# Patient Record
Sex: MALE | Race: WHITE | Employment: FULL TIME | URBAN - METROPOLITAN AREA
[De-identification: names, ages, dates, MRNs, and addresses within clinical notes are randomized per-mention and may not be internally consistent; named-entity substitution may affect disease eponyms.]

---

## 2020-11-27 ENCOUNTER — HOSPITAL ENCOUNTER (OUTPATIENT)
Age: 30
Discharge: HOME | End: 2020-11-27
Attending: EMERGENCY MEDICINE
Payer: OTHER GOVERNMENT

## 2020-11-27 VITALS
HEART RATE: 85 BPM | OXYGEN SATURATION: 98 % | DIASTOLIC BLOOD PRESSURE: 56 MMHG | HEIGHT: 72 IN | WEIGHT: 280 LBS | RESPIRATION RATE: 16 BRPM | SYSTOLIC BLOOD PRESSURE: 137 MMHG | BODY MASS INDEX: 37.93 KG/M2 | TEMPERATURE: 98 F

## 2020-11-27 DIAGNOSIS — Z20.822 ENCOUNTER FOR LABORATORY TESTING FOR COVID-19 VIRUS: Primary | ICD-10-CM

## 2020-11-27 DIAGNOSIS — J18.9 COMMUNITY ACQUIRED PNEUMONIA, UNSPECIFIED LATERALITY: ICD-10-CM

## 2020-11-27 PROCEDURE — 99213 OFFICE O/P EST LOW 20 MIN: CPT | Performed by: EMERGENCY MEDICINE

## 2020-11-27 RX ORDER — AZITHROMYCIN 250 MG/1
TABLET, FILM COATED ORAL
Qty: 1 PACKAGE | Refills: 0 | Status: SHIPPED | OUTPATIENT
Start: 2020-11-27 | End: 2020-12-02

## 2020-11-27 RX ORDER — ALBUTEROL SULFATE 90 UG/1
2 AEROSOL, METERED RESPIRATORY (INHALATION) EVERY 4 HOURS PRN
Qty: 1 INHALER | Refills: 0 | Status: SHIPPED | OUTPATIENT
Start: 2020-11-27 | End: 2020-12-27

## 2020-11-27 NOTE — ED INITIAL ASSESSMENT (HPI)
Pt is here for covid testing. Wife is positive with covid. Pt has body aches, loss of taste and smell, cough since Saturday.

## 2022-03-15 ENCOUNTER — HOSPITAL ENCOUNTER (OUTPATIENT)
Age: 32
Discharge: HOME | End: 2022-03-15

## 2022-03-15 VITALS
SYSTOLIC BLOOD PRESSURE: 110 MMHG | RESPIRATION RATE: 18 BRPM | OXYGEN SATURATION: 97 % | HEART RATE: 85 BPM | HEIGHT: 72 IN | TEMPERATURE: 99 F | BODY MASS INDEX: 35.89 KG/M2 | WEIGHT: 265 LBS | DIASTOLIC BLOOD PRESSURE: 67 MMHG

## 2022-03-15 DIAGNOSIS — J02.0 STREPTOCOCCAL SORE THROAT: Primary | ICD-10-CM

## 2022-03-15 LAB — S PYO AG THROAT QL: POSITIVE

## 2022-03-15 PROCEDURE — 87880 STREP A ASSAY W/OPTIC: CPT | Performed by: NURSE PRACTITIONER

## 2022-03-15 PROCEDURE — 99203 OFFICE O/P NEW LOW 30 MIN: CPT | Performed by: NURSE PRACTITIONER

## 2022-03-15 RX ORDER — PENICILLIN V POTASSIUM 500 MG/1
500 TABLET ORAL 2 TIMES DAILY
Qty: 40 TABLET | Refills: 0 | Status: SHIPPED | OUTPATIENT
Start: 2022-03-15 | End: 2022-03-25

## 2022-11-02 ENCOUNTER — APPOINTMENT (OUTPATIENT)
Dept: GENERAL RADIOLOGY | Age: 32
End: 2022-11-02
Attending: NURSE PRACTITIONER

## 2022-11-02 ENCOUNTER — APPOINTMENT (OUTPATIENT)
Dept: CT IMAGING | Age: 32
End: 2022-11-02
Attending: NURSE PRACTITIONER

## 2022-11-02 ENCOUNTER — HOSPITAL ENCOUNTER (OUTPATIENT)
Age: 32
Discharge: HOME | End: 2022-11-02

## 2022-11-02 VITALS
HEART RATE: 85 BPM | SYSTOLIC BLOOD PRESSURE: 124 MMHG | RESPIRATION RATE: 20 BRPM | OXYGEN SATURATION: 98 % | TEMPERATURE: 98 F | DIASTOLIC BLOOD PRESSURE: 70 MMHG
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
## 2022-11-02 DIAGNOSIS — M48.48XA STRESS FRACTURE OF SACRUM, INITIAL ENCOUNTER: ICD-10-CM

## 2022-11-02 DIAGNOSIS — S39.012A BACK STRAIN, INITIAL ENCOUNTER: ICD-10-CM

## 2022-11-02 PROCEDURE — 72128 CT CHEST SPINE W/O DYE: CPT | Performed by: NURSE PRACTITIONER

## 2022-11-02 PROCEDURE — 72072 X-RAY EXAM THORAC SPINE 3VWS: CPT | Performed by: NURSE PRACTITIONER

## 2022-11-02 PROCEDURE — 72131 CT LUMBAR SPINE W/O DYE: CPT | Performed by: NURSE PRACTITIONER

## 2022-11-02 PROCEDURE — 72100 X-RAY EXAM L-S SPINE 2/3 VWS: CPT | Performed by: NURSE PRACTITIONER

## 2022-11-02 PROCEDURE — 99214 OFFICE O/P EST MOD 30 MIN: CPT | Performed by: NURSE PRACTITIONER

## 2022-11-02 RX ORDER — CYCLOBENZAPRINE HCL 10 MG
10 TABLET ORAL 3 TIMES DAILY PRN
Qty: 10 TABLET | Refills: 0 | Status: SHIPPED | OUTPATIENT
Start: 2022-11-02 | End: 2022-11-09

## 2022-11-02 RX ORDER — IBUPROFEN 600 MG/1
600 TABLET ORAL ONCE
Status: COMPLETED | OUTPATIENT
Start: 2022-11-02 | End: 2022-11-02

## 2022-11-02 RX ORDER — IBUPROFEN 600 MG/1
600 TABLET ORAL EVERY 6 HOURS PRN
Qty: 12 TABLET | Refills: 0 | Status: SHIPPED | OUTPATIENT
Start: 2022-11-02 | End: 2022-11-09

## 2022-11-02 NOTE — DISCHARGE INSTRUCTIONS
Ibuprofen as needed for pain. Flexeril to relax tight muscles. Do not take this and drive or drink alcohol. Ice. Gentle stretching. Push fluids. Expect to be sore for the next few days, you will be more sore tomorrow. You should follow-up with orthopedics to discuss the CT findings.

## 2023-12-22 ENCOUNTER — HOSPITAL ENCOUNTER (OUTPATIENT)
Age: 33
Discharge: HOME | End: 2023-12-22
Payer: MEDICAID

## 2023-12-22 ENCOUNTER — APPOINTMENT (OUTPATIENT)
Dept: GENERAL RADIOLOGY | Age: 33
End: 2023-12-22
Attending: NURSE PRACTITIONER
Payer: MEDICAID

## 2023-12-22 VITALS
TEMPERATURE: 98 F | BODY MASS INDEX: 37.11 KG/M2 | WEIGHT: 280 LBS | HEIGHT: 73 IN | RESPIRATION RATE: 24 BRPM | OXYGEN SATURATION: 98 % | DIASTOLIC BLOOD PRESSURE: 82 MMHG | HEART RATE: 88 BPM | SYSTOLIC BLOOD PRESSURE: 137 MMHG

## 2023-12-22 DIAGNOSIS — J06.9 UPPER RESPIRATORY VIRUS: ICD-10-CM

## 2023-12-22 DIAGNOSIS — H66.92 LEFT OTITIS MEDIA, UNSPECIFIED OTITIS MEDIA TYPE: Primary | ICD-10-CM

## 2023-12-22 LAB
POCT INFLUENZA A: NEGATIVE
POCT INFLUENZA B: NEGATIVE
SARS-COV-2 RNA RESP QL NAA+PROBE: NOT DETECTED

## 2023-12-22 PROCEDURE — U0002 COVID-19 LAB TEST NON-CDC: HCPCS | Performed by: NURSE PRACTITIONER

## 2023-12-22 PROCEDURE — 87502 INFLUENZA DNA AMP PROBE: CPT | Performed by: NURSE PRACTITIONER

## 2023-12-22 PROCEDURE — 99213 OFFICE O/P EST LOW 20 MIN: CPT | Performed by: NURSE PRACTITIONER

## 2023-12-22 PROCEDURE — 71046 X-RAY EXAM CHEST 2 VIEWS: CPT | Performed by: NURSE PRACTITIONER

## 2023-12-22 RX ORDER — BENZONATATE 100 MG/1
200 CAPSULE ORAL 3 TIMES DAILY PRN
Qty: 30 CAPSULE | Refills: 0 | Status: SHIPPED | OUTPATIENT
Start: 2023-12-22 | End: 2024-01-21

## 2023-12-22 RX ORDER — AMOXICILLIN AND CLAVULANATE POTASSIUM 875; 125 MG/1; MG/1
1 TABLET, FILM COATED ORAL 2 TIMES DAILY
Qty: 20 TABLET | Refills: 0 | Status: SHIPPED | OUTPATIENT
Start: 2023-12-22 | End: 2024-01-01

## 2023-12-22 NOTE — DISCHARGE INSTRUCTIONS
Push fluids. Rest.  Tylenol or ibuprofen as needed for pain or fever. Take the medications as prescribed. Follow-up with your doctor the doctor referred to. Return for any concerns.

## 2023-12-22 NOTE — ED INITIAL ASSESSMENT (HPI)
Pt with cough, sore throat, body aches, chills x 3 days.  States cough has been present for ~ 1-2 weeks but has worsened over the past three days

## 2024-06-29 ENCOUNTER — HOSPITAL ENCOUNTER (OUTPATIENT)
Age: 34
Discharge: HOME | End: 2024-06-29
Payer: MEDICAID

## 2024-06-29 ENCOUNTER — APPOINTMENT (OUTPATIENT)
Dept: GENERAL RADIOLOGY | Age: 34
End: 2024-06-29
Attending: NURSE PRACTITIONER
Payer: MEDICAID

## 2024-06-29 VITALS
OXYGEN SATURATION: 96 % | DIASTOLIC BLOOD PRESSURE: 75 MMHG | SYSTOLIC BLOOD PRESSURE: 129 MMHG | RESPIRATION RATE: 22 BRPM | HEART RATE: 98 BPM | TEMPERATURE: 98 F

## 2024-06-29 DIAGNOSIS — J04.0 ACUTE LARYNGITIS: ICD-10-CM

## 2024-06-29 DIAGNOSIS — R05.1 ACUTE COUGH: Primary | ICD-10-CM

## 2024-06-29 PROCEDURE — 71046 X-RAY EXAM CHEST 2 VIEWS: CPT | Performed by: NURSE PRACTITIONER

## 2024-06-29 PROCEDURE — 70360 X-RAY EXAM OF NECK: CPT | Performed by: NURSE PRACTITIONER

## 2024-06-29 PROCEDURE — 99213 OFFICE O/P EST LOW 20 MIN: CPT | Performed by: NURSE PRACTITIONER

## 2024-06-29 RX ORDER — AMOXICILLIN AND CLAVULANATE POTASSIUM 875; 125 MG/1; MG/1
1 TABLET, FILM COATED ORAL 2 TIMES DAILY
Qty: 20 TABLET | Refills: 0 | Status: SHIPPED | OUTPATIENT
Start: 2024-06-29 | End: 2024-07-09

## 2024-06-29 RX ORDER — PREDNISONE 20 MG/1
40 TABLET ORAL DAILY
Qty: 10 TABLET | Refills: 0 | Status: SHIPPED | OUTPATIENT
Start: 2024-06-29 | End: 2024-07-04

## 2024-06-29 NOTE — ED INITIAL ASSESSMENT (HPI)
Pt c/o cough + horse voice for almost a month, denies fever

## 2024-06-29 NOTE — ED PROVIDER NOTES
Patient Seen in: [facility]      History     Chief Complaint   Patient presents with    Cough                                                                                                                                                              Stated Complaint: Cough    Subjective:   HPI    33yo male p/w cough and laryngitis x 1 month. States he smokes 1ppd of cigarrettes and marijuana daily. Now is getting more SOB, \"usually I can run around and now I get sob.\" No unexplained wt loss/gain. Cough is non-productive. Denies f/c/n/v/d. No recent sick exposures. Denies recent infections/covid exposures. No family history of head/neck cancer.   Has been smoking cigarettes 1ppd since the age of 20.      Objective:   History reviewed. No pertinent past medical history.           History reviewed. No pertinent surgical history.             Social History     Socioeconomic History    Marital status: Single   Tobacco Use    Smoking status: Heavy Smoker     Types: Cigarettes    Smokeless tobacco: Never   Substance and Sexual Activity    Drug use: Yes     Types: Cannabis              Review of Systems    Positive for stated Chief Complaint: Cough (//////////////////////////////////////////////////////////////////////////)    Other systems are as noted in HPI.  Constitutional and vital signs reviewed.      All other systems reviewed and negative except as noted above.    Physical Exam     ED Triage Vitals [06/29/24 1333]   /75   Pulse 98   Resp 22   Temp 97.9 °F (36.6 °C)   Temp src Temporal   SpO2 96 %   O2 Device None (Room air)       Current Vitals:   Vital Signs  BP: 129/75  Pulse: 98  Resp: 22  Temp: 97.9 °F (36.6 °C)  Temp src: Temporal    Oxygen Therapy  SpO2: 96 %  O2 Device: None (Room air)            Physical Exam  Vitals and nursing note reviewed.   HENT:      Head: Normocephalic.      Right Ear: Tympanic membrane normal.      Left Ear: Tympanic membrane normal.      Nose: Nose normal.       Mouth/Throat:      Mouth: Mucous membranes are moist.      Comments: Voice hoarse, stridorous  Eyes:      Pupils: Pupils are equal, round, and reactive to light.   Cardiovascular:      Rate and Rhythm: Normal rate and regular rhythm.   Pulmonary:      Effort: Pulmonary effort is normal. No respiratory distress.      Breath sounds: No wheezing.   Abdominal:      General: There is no distension.      Tenderness: There is no abdominal tenderness.   Musculoskeletal:         General: Normal range of motion.      Cervical back: Normal range of motion.   Skin:     General: Skin is warm and dry.      Capillary Refill: Capillary refill takes less than 2 seconds.   Neurological:      Mental Status: He is alert.               ED Course   Labs Reviewed - No data to display       ED Course as of 06/29/24 1523  ------------------------------------------------------------  Time: 06/29 1503  Value: XR CHEST PA + LAT CHEST (OMQ=85657)  Comment: No radiographic evidence of acute cardiopulmonary process.      Sequela of prior granulomatous disease, similar to prior.     ------------------------------------------------------------  Time: 06/29 1504  Value: XR SOFT TISSUE NECK (CPT=70360)  Comment:    Unremarkable soft tissue neck radiographs.  No radiopaque foreign body.     ------------------------------------------------------------  Time: 06/29 1505  Value: XR SOFT TISSUE NECK (CPT=70360)  Comment: (Reviewed)              MDM                                         Medical Decision Making  33yo male p/w laryngitis, hoarse voice x 1 month. No relieving nor exacerbating factors.     Xray of chest>I have directly viewed this exam, No pna as clinically questioned. Pending rad read.     Xray>No radiographic evidence of acute cardiopulmonary process.     Sequela of prior granulomatous disease, similar to prior.       XRAY soft tissue neck> Unremarkable soft tissue neck radiographs.  No radiopaque foreign body.    Discussed infection v  inflammatory process. Will rx antibiotics given prior granulomatous disease.     Physical exam remained stable over serial reexaminations as previously documented. External chart review completed. No recent hospitalizations for the same.      I have discussed with the patient the results of tests, differential diagnosis, and warning signs and symptoms that should prompt immediate return.  The patient understands these instructions and agrees to the follow-up plan provided.  There are no barriers to learning.   Appropriate f/u given.  Patient agrees to return for any concerns/problems/complications.    Differential diagnosis reflecting the complexity of care include: PNA, sore throat, viral pharyngitis v bacterial pharyngitis    Comorbidities that add complexity to management include:na    External chart review was done and was noted:Yes    History obtained by an independent source was from: Patient        Discussions of management was done with:Patient    My independent interpretation of studies of:Xray    Diagnostic tests and medications considered but not ordered were:na    Social determinants of health that affect care:NA    Shared decision making was done by Self, Patient            Disposition and Plan     Clinical Impression:  1. Acute cough    2. Acute laryngitis         Disposition:  Discharge  6/29/2024  3:08 pm    Follow-up:  Lenard Bunch MD  [address] [facility] [address]  [phone]                Medications Prescribed:  Discharge Medication List as of 6/29/2024  3:10 PM        START taking these medications    Details   predniSONE 20 MG Oral Tab Take 2 tablets (40 mg total) by mouth daily for 5 days., Normal, Disp-10 tablet, R-0      amoxicillin clavulanate 875-125 MG Oral Tab Take 1 tablet by mouth 2 (two) times daily for 10 days., Normal, Disp-20 tablet, R-0

## 2024-07-29 ENCOUNTER — HOSPITAL ENCOUNTER (OUTPATIENT)
Age: 34
Discharge: HOME | End: 2024-07-29
Payer: MEDICAID

## 2024-07-29 ENCOUNTER — APPOINTMENT (OUTPATIENT)
Dept: GENERAL RADIOLOGY | Age: 34
End: 2024-07-29
Payer: MEDICAID

## 2024-07-29 VITALS
SYSTOLIC BLOOD PRESSURE: 149 MMHG | TEMPERATURE: 98 F | HEART RATE: 100 BPM | DIASTOLIC BLOOD PRESSURE: 73 MMHG | OXYGEN SATURATION: 97 % | RESPIRATION RATE: 18 BRPM

## 2024-07-29 DIAGNOSIS — J98.01 BRONCHOSPASM: ICD-10-CM

## 2024-07-29 DIAGNOSIS — R06.02 SHORTNESS OF BREATH: Primary | ICD-10-CM

## 2024-07-29 LAB
#MXD IC: 0.7 X10ˆ3/UL (ref 0.1–1)
BUN BLD-MCNC: 14 MG/DL (ref 7–18)
CHLORIDE BLD-SCNC: 102 MMOL/L (ref 98–112)
CO2 BLD-SCNC: 27 MMOL/L (ref 21–32)
CREAT BLD-MCNC: 1 MG/DL
DDIMER WHOLE BLOOD: <200 NG/ML DDU (ref ?–400)
EGFRCR SERPLBLD CKD-EPI 2021: 102 ML/MIN/1.73M2 (ref 60–?)
GLUCOSE BLD-MCNC: 100 MG/DL (ref 70–99)
HCT VFR BLD AUTO: 45.5 %
HCT VFR BLD CALC: 48 %
HGB BLD-MCNC: 14.9 G/DL
ISTAT IONIZED CALCIUM FOR CHEM 8: 1.17 MMOL/L (ref 1.12–1.32)
LYMPHOCYTES # BLD AUTO: 2.2 X10ˆ3/UL (ref 1–4)
LYMPHOCYTES NFR BLD AUTO: 25 %
MCH RBC QN AUTO: 29.4 PG (ref 26–34)
MCHC RBC AUTO-ENTMCNC: 32.7 G/DL (ref 31–37)
MCV RBC AUTO: 89.9 FL (ref 80–100)
MIXED CELL %: 7.9 %
NEUTROPHILS # BLD AUTO: 5.9 X10ˆ3/UL (ref 1.5–7.7)
NEUTROPHILS NFR BLD AUTO: 67.1 %
PLATELET # BLD AUTO: 297 X10ˆ3/UL (ref 150–450)
POTASSIUM BLD-SCNC: 3.9 MMOL/L (ref 3.6–5.1)
RBC # BLD AUTO: 5.06 X10ˆ6/UL
SODIUM BLD-SCNC: 141 MMOL/L (ref 136–145)
TROPONIN I BLD-MCNC: <0.02 NG/ML
WBC # BLD AUTO: 8.8 X10ˆ3/UL (ref 4–11)

## 2024-07-29 PROCEDURE — 93000 ELECTROCARDIOGRAM COMPLETE: CPT

## 2024-07-29 PROCEDURE — 70360 X-RAY EXAM OF NECK: CPT

## 2024-07-29 PROCEDURE — 94640 AIRWAY INHALATION TREATMENT: CPT | Performed by: EMERGENCY MEDICINE

## 2024-07-29 PROCEDURE — 84484 ASSAY OF TROPONIN QUANT: CPT

## 2024-07-29 PROCEDURE — 71046 X-RAY EXAM CHEST 2 VIEWS: CPT

## 2024-07-29 PROCEDURE — 80047 BASIC METABLC PNL IONIZED CA: CPT

## 2024-07-29 PROCEDURE — 85025 COMPLETE CBC W/AUTO DIFF WBC: CPT

## 2024-07-29 PROCEDURE — 99214 OFFICE O/P EST MOD 30 MIN: CPT

## 2024-07-29 RX ORDER — ALBUTEROL SULFATE 90 UG/1
2 AEROSOL, METERED RESPIRATORY (INHALATION) EVERY 6 HOURS PRN
Qty: 1 EACH | Refills: 0 | Status: SHIPPED | OUTPATIENT
Start: 2024-07-29 | End: 2024-08-28

## 2024-07-29 RX ORDER — PREDNISONE 20 MG/1
40 TABLET ORAL DAILY
Qty: 10 TABLET | Refills: 0 | Status: SHIPPED | OUTPATIENT
Start: 2024-07-29 | End: 2024-08-03

## 2024-07-29 RX ORDER — ALBUTEROL SULFATE 90 UG/1
2 AEROSOL, METERED RESPIRATORY (INHALATION) ONCE
Status: COMPLETED | OUTPATIENT
Start: 2024-07-29 | End: 2024-07-29

## 2024-07-29 NOTE — DISCHARGE INSTRUCTIONS
Your chest x-ray, neck x-ray, EKG and lab work were all unremarkable.  You should stop smoking which will help your shortness of breath.  It is important that you establish care with a primary care doctor to further evaluate your shortness of breath.  I sent a prescription for prednisone to treat your shortness of breath.    I also sent a prescription for an albuterol inhaler to be used at home as needed for cough.  If you need the albuterol more than every 4 hours you should go to the emergency department.    If you have any increased respiratory distress, fever that does not resolve with medication or any other concerning complaints they should go to the emergency department.    You should make an appointment later this week to follow-up with primary care doctor to ensure resolution/improvement of symptoms.

## 2024-07-29 NOTE — ED INITIAL ASSESSMENT (HPI)
Pt complaining of difficulty breathing for a couple of months. Pt stopped using cocaine a month ago.  +smoker.  Pt was seen a month ago at the  and was given steroids and augmentin for his cough.

## 2024-07-29 NOTE — ED PROVIDER NOTES
Patient Seen in: [facility]      History     Chief Complaint   Patient presents with    Difficulty Breathing     Stated Complaint: Shortness of breath  Subjective:   Spencer is a 34 year old male presenting to the immediate care complaining of shortness of breath.  Patient states that he has felt some shortness of breath every day for the past month.  Patient has been here frequently for shortness of breath in the past.  Patient states that he smokes cigarettes and marijuana every day.  Patient also does cocaine frequently.  States last time he did cocaine was about a month ago.  Patient states that he feels like he has asthma.  Does not see a primary care doctor.  Patient states that he has not had any chest pain.  States that the shortness of breath is \"all the time for the last month\".  Denies any abdominal pain, vomiting, fever or URI complaints.  He is eating and drinking well and is well-hydrated.  He denies any other concerns or complaints.  Patient is a .  No recent surgery.  No history of DVT.        Objective:   History reviewed. No pertinent past medical history.         History reviewed. No pertinent surgical history.           Social History     Socioeconomic History    Marital status: Single   Tobacco Use    Smoking status: Heavy Smoker     Types: Cigarettes    Smokeless tobacco: Never   Substance and Sexual Activity    Drug use: Yes     Types: Cannabis            Review of Systems    Positive for stated complaint: Difficulty Breathing    Other systems are as noted in HPI.  Constitutional and vital signs reviewed.      All other systems reviewed and negative except as noted above.    Physical Exam     ED Triage Vitals [07/29/24 1651]   /73   Pulse 108   Resp 18   Temp 98 °F (36.7 °C)   Temp src Temporal   SpO2 96 %   O2 Device None (Room air)     Current:/73   Pulse 100   Temp 98 °F (36.7 °C) (Temporal)   Resp 18   SpO2 97%     Physical Exam  Vitals and nursing  note reviewed.   Constitutional:       General: He is not in acute distress.     Appearance: Normal appearance. He is not ill-appearing, toxic-appearing or diaphoretic.      Interventions: He is not intubated.  HENT:      Head: Normocephalic.      Right Ear: Tympanic membrane, ear canal and external ear normal.      Left Ear: Tympanic membrane, ear canal and external ear normal.      Nose: Nose normal.      Mouth/Throat:      Mouth: Mucous membranes are moist.      Pharynx: Oropharynx is clear.   Eyes:      Conjunctiva/sclera: Conjunctivae normal.   Cardiovascular:      Rate and Rhythm: Normal rate and regular rhythm.      Pulses: Normal pulses.      Heart sounds: Normal heart sounds.   Pulmonary:      Effort: Tachypnea and accessory muscle usage present. No bradypnea, prolonged expiration, respiratory distress or retractions. He is not intubated.      Breath sounds: Decreased air movement present. No stridor or transmitted upper airway sounds. No decreased breath sounds, wheezing, rhonchi or rales.      Comments: No stridor on auscultation.    Shortness of breath, tachypnea and accessory muscle use improved following albuterol MDI.  Abdominal:      General: Abdomen is flat.   Musculoskeletal:         General: Normal range of motion.      Cervical back: Normal range of motion.   Skin:     General: Skin is warm.      Capillary Refill: Capillary refill takes less than 2 seconds.   Neurological:      General: No focal deficit present.      Mental Status: He is alert and oriented to person, place, and time.   Psychiatric:         Mood and Affect: Mood normal.         Behavior: Behavior normal.         Thought Content: Thought content normal.         Judgment: Judgment normal.         ED Course   Radiology:  XR SOFT TISSUE NECK (CPT=70360)    Result Date: 7/29/2024  CONCLUSION: Unremarkable neck/airway radiographs.  Dictated by (CST): Jeff Whelan MD on 7/29/2024 at 5:56 PM     Finalized by (CST): Jeff Whelan MD on  7/29/2024 at 5:57 PM          XR CHEST PA + LAT CHEST (CPT=71046)    Result Date: 7/29/2024  CONCLUSION:  1. Unchanged chest.  No acute appearing disease.  Again noted are multiple small calcified granulomata throughout the bilateral lung fields.  Correlate clinically.    Dictated by (CST): Derek Duckworth MD on 7/29/2024 at 5:54 PM     Finalized by (CST): Derek Duckworth MD on 7/29/2024 at 5:57 PM         Labs Reviewed   POCT ISTAT CHEM8 CARTRIDGE - Abnormal; Notable for the following components:       Result Value    ISTAT Glucose 100 (*)     All other components within normal limits   D-DIMER (POC) - Normal   ISTAT TROPONIN - Normal   POCT CBC     EKG    Rate, intervals and axes as noted on EKG Report.  Rate: 85  Rhythm: Sinus Rhythm  Reading: Normal sinus rhythm with sinus arrhythmia, incomplete right bundle branch block.  No previous EKG for comparison.           MDM     Medical Decision Making  Differential diagnoses reflecting the complexity of care include but are not limited to bronchospasm, ACS, PE.    Comorbidities that add complexity to management include: , smoker, cocaine use, marijuana use  History obtained by an independent source was from: Patient  My independent interpretations of studies include: X-ray, EKG  Patient is well appearing, non-toxic and in no acute distress.  Vital signs are stable.   34-year-old male presents to the immediate care with shortness of breath for more than a month.  Patient denies any chest pain.  Patient has risk factors for asthma and PE.  EKG shows normal sinus rhythm with sinus arrhythmia and a incomplete right bundle branch block.  No previous EKG for comparison.  Chest x-ray was unchanged from previous.  Soft tissue neck x-ray was unremarkable.  Patient arrived with some noisy breathing that improved after an albuterol MDI.  No stridor on auscultation.  CBC and BMP were unremarkable.  Troponin and D-dimer were both negative.  History and physical exam are  consistent with bronchospasm.  Sent prescription for short course of prednisone to treat asthma exacerbation.  Sent a prescription for an albuterol inhaler, spacer given to be used at home as needed for cough.  Recommended that if the patient needs the albuterol more than every 4 hours they go to the emergency department.  Also recommended that if the patient has any increased respiratory distress, fever that does not resolve with medication or any other concerning complaints they should go to the emergency department.  I also recommended the patient make an appointment next week to follow-up with primary care doctor to ensure resolution/improvement of symptoms.    ED precautions discussed.  Patient (guardian) advised to follow up with PCP in 2-3 days.  Patient (guardian) agrees with this plan of care.  Patient (guardian) verbalizes understanding of discharge instructions and plan of care.  Patient discussed with Dr. Alvarez on the phone who agrees with this plan.      Amount and/or Complexity of Data Reviewed  Labs: ordered. Decision-making details documented in ED Course.  Radiology: ordered and independent interpretation performed. Decision-making details documented in ED Course.  ECG/medicine tests: ordered and independent interpretation performed. Decision-making details documented in ED Course.    Risk  OTC drugs.  Prescription drug management.        Disposition and Plan     Clinical Impression:  1. Shortness of breath    2. Bronchospasm         Disposition:  Discharge  7/29/2024  6:06 pm    Follow-up:  Bryant Perdue MD  [address] [place] [address]  [phone]                Medications Prescribed:  Current Discharge Medication List        START taking these medications    Details   albuterol 108 (90 Base) MCG/ACT Inhalation Aero Soln Inhale 2 puffs into the lungs every 6 (six) hours as needed for Wheezing.  Qty: 1 each, Refills: 0

## 2024-07-30 LAB
ATRIAL RATE: 85 BPM
P AXIS: 67 DEGREES
P-R INTERVAL: 154 MS
Q-T INTERVAL: 350 MS
QRS DURATION: 116 MS
QTC CALCULATION (BEZET): 416 MS
R AXIS: 17 DEGREES
T AXIS: 45 DEGREES
VENTRICULAR RATE: 85 BPM

## (undated) NOTE — LETTER
[facility]  [facility] [address]  [phone]     Patient: Antonio Park   YOB: 1990   Date of Visit: 11/27/2020     Dear Employer,        November 27, 2020    At [facility], we are taking special precauti Persons infected with SARS-CoV-2 who never develop COVID-19 symptoms may discontinue isolation and other precautions 10 days after the date of their first positive RT-PCR test for SARS-CoV-2 RNA.     Persons who are asymptomatic but have been exposed, CDC r